# Patient Record
Sex: MALE | Race: WHITE | Employment: FULL TIME | URBAN - METROPOLITAN AREA
[De-identification: names, ages, dates, MRNs, and addresses within clinical notes are randomized per-mention and may not be internally consistent; named-entity substitution may affect disease eponyms.]

---
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
## 2021-12-25 ENCOUNTER — HOSPITAL ENCOUNTER (EMERGENCY)
Facility: CLINIC | Age: 21
Discharge: HOME | End: 2021-12-25
Attending: EMERGENCY MEDICINE | Admitting: EMERGENCY MEDICINE
Payer: COMMERCIAL

## 2021-12-25 ENCOUNTER — APPOINTMENT (OUTPATIENT)
Dept: CT IMAGING | Facility: CLINIC | Age: 21
End: 2021-12-25
Attending: EMERGENCY MEDICINE
Payer: COMMERCIAL

## 2021-12-25 VITALS
OXYGEN SATURATION: 99 % | RESPIRATION RATE: 18 BRPM | WEIGHT: 140 LBS | TEMPERATURE: 98.4 F | DIASTOLIC BLOOD PRESSURE: 84 MMHG | HEIGHT: 65 IN | SYSTOLIC BLOOD PRESSURE: 136 MMHG | BODY MASS INDEX: 23.32 KG/M2 | HEART RATE: 80 BPM

## 2021-12-25 DIAGNOSIS — S09.90XA CLOSED HEAD INJURY, INITIAL ENCOUNTER: ICD-10-CM
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
## 2021-12-25 DIAGNOSIS — S01.81XA LACERATION OF FOREHEAD, INITIAL ENCOUNTER: ICD-10-CM

## 2021-12-25 DIAGNOSIS — E87.6 HYPOKALEMIA: ICD-10-CM

## 2021-12-25 DIAGNOSIS — F10.920 ALCOHOLIC INTOXICATION WITHOUT COMPLICATION (H): ICD-10-CM

## 2021-12-25 LAB
ALBUMIN SERPL-MCNC: 4.3 G/DL (ref 3.4–5)
ALP SERPL-CCNC: 67 U/L (ref 40–150)
ALT SERPL W P-5'-P-CCNC: 32 U/L (ref 0–70)
AMPHETAMINES UR QL SCN: ABNORMAL
ANION GAP SERPL CALCULATED.3IONS-SCNC: 6 MMOL/L (ref 3–14)
APTT PPP: 30 SECONDS (ref 22–38)
AST SERPL W P-5'-P-CCNC: 17 U/L (ref 0–45)
BARBITURATES UR QL: ABNORMAL
BASOPHILS # BLD AUTO: 0.1 10E3/UL (ref 0–0.2)
BASOPHILS NFR BLD AUTO: 1 %
BENZODIAZ UR QL: ABNORMAL
BILIRUB SERPL-MCNC: 0.3 MG/DL (ref 0.2–1.3)
BUN SERPL-MCNC: 10 MG/DL (ref 7–30)
CALCIUM SERPL-MCNC: 8.5 MG/DL (ref 8.5–10.1)
CANNABINOIDS UR QL SCN: ABNORMAL
CHLORIDE BLD-SCNC: 108 MMOL/L (ref 94–109)
CO2 SERPL-SCNC: 26 MMOL/L (ref 20–32)
COCAINE UR QL: ABNORMAL
CREAT SERPL-MCNC: 0.81 MG/DL (ref 0.66–1.25)
EOSINOPHIL # BLD AUTO: 0.1 10E3/UL (ref 0–0.7)
EOSINOPHIL NFR BLD AUTO: 2 %
ERYTHROCYTE [DISTWIDTH] IN BLOOD BY AUTOMATED COUNT: 12.2 % (ref 10–15)
ETHANOL SERPL-MCNC: 0.27 G/DL
GFR SERPL CREATININE-BSD FRML MDRD: >90 ML/MIN/1.73M2
GLUCOSE BLD-MCNC: 115 MG/DL (ref 70–99)
HCT VFR BLD AUTO: 45 % (ref 40–53)
HGB BLD-MCNC: 14.4 G/DL (ref 13.3–17.7)
HOLD SPECIMEN: NORMAL
HOLD SPECIMEN: NORMAL
IMM GRANULOCYTES # BLD: 0 10E3/UL
IMM GRANULOCYTES NFR BLD: 1 %
INR PPP: 0.89 (ref 0.85–1.15)
LYMPHOCYTES # BLD AUTO: 3.1 10E3/UL (ref 0.8–5.3)
LYMPHOCYTES NFR BLD AUTO: 36 %
MCH RBC QN AUTO: 30.3 PG (ref 26.5–33)
MCHC RBC AUTO-ENTMCNC: 32 G/DL (ref 31.5–36.5)
MCV RBC AUTO: 95 FL (ref 78–100)
MONOCYTES # BLD AUTO: 0.5 10E3/UL (ref 0–1.3)
MONOCYTES NFR BLD AUTO: 5 %
NEUTROPHILS # BLD AUTO: 4.7 10E3/UL (ref 1.6–8.3)
NEUTROPHILS NFR BLD AUTO: 55 %
NRBC # BLD AUTO: 0 10E3/UL
NRBC BLD AUTO-RTO: 0 /100
OPIATES UR QL SCN: ABNORMAL
PLATELET # BLD AUTO: 354 10E3/UL (ref 150–450)
POTASSIUM BLD-SCNC: 3.3 MMOL/L (ref 3.4–5.3)
PROT SERPL-MCNC: 8.1 G/DL (ref 6.8–8.8)
RBC # BLD AUTO: 4.75 10E6/UL (ref 4.4–5.9)
SODIUM SERPL-SCNC: 140 MMOL/L (ref 133–144)
WBC # BLD AUTO: 8.5 10E3/UL (ref 4–11)

## 2021-12-25 PROCEDURE — 99285 EMERGENCY DEPT VISIT HI MDM: CPT | Mod: 25

## 2021-12-25 PROCEDURE — 72128 CT CHEST SPINE W/O DYE: CPT

## 2021-12-25 PROCEDURE — 12011 RPR F/E/E/N/L/M 2.5 CM/<: CPT

## 2021-12-25 PROCEDURE — 85730 THROMBOPLASTIN TIME PARTIAL: CPT | Performed by: EMERGENCY MEDICINE

## 2021-12-25 PROCEDURE — 80307 DRUG TEST PRSMV CHEM ANLYZR: CPT | Performed by: EMERGENCY MEDICINE

## 2021-12-25 PROCEDURE — 80053 COMPREHEN METABOLIC PANEL: CPT | Performed by: EMERGENCY MEDICINE

## 2021-12-25 PROCEDURE — 70486 CT MAXILLOFACIAL W/O DYE: CPT

## 2021-12-25 PROCEDURE — 72131 CT LUMBAR SPINE W/O DYE: CPT

## 2021-12-25 PROCEDURE — 90471 IMMUNIZATION ADMIN: CPT | Performed by: EMERGENCY MEDICINE

## 2021-12-25 PROCEDURE — 76705 ECHO EXAM OF ABDOMEN: CPT

## 2021-12-25 PROCEDURE — 85025 COMPLETE CBC W/AUTO DIFF WBC: CPT | Performed by: EMERGENCY MEDICINE

## 2021-12-25 PROCEDURE — 250N000009 HC RX 250: Performed by: EMERGENCY MEDICINE

## 2021-12-25 PROCEDURE — 250N000011 HC RX IP 250 OP 636: Performed by: EMERGENCY MEDICINE

## 2021-12-25 PROCEDURE — 85610 PROTHROMBIN TIME: CPT | Performed by: EMERGENCY MEDICINE

## 2021-12-25 PROCEDURE — 72125 CT NECK SPINE W/O DYE: CPT

## 2021-12-25 PROCEDURE — 74177 CT ABD & PELVIS W/CONTRAST: CPT

## 2021-12-25 PROCEDURE — 82077 ASSAY SPEC XCP UR&BREATH IA: CPT | Performed by: EMERGENCY MEDICINE

## 2021-12-25 PROCEDURE — 70450 CT HEAD/BRAIN W/O DYE: CPT

## 2021-12-25 PROCEDURE — 90714 TD VACC NO PRESV 7 YRS+ IM: CPT | Performed by: EMERGENCY MEDICINE

## 2021-12-25 PROCEDURE — 36415 COLL VENOUS BLD VENIPUNCTURE: CPT | Performed by: EMERGENCY MEDICINE

## 2021-12-25 PROCEDURE — 250N000013 HC RX MED GY IP 250 OP 250 PS 637: Performed by: EMERGENCY MEDICINE

## 2021-12-25 RX ORDER — POTASSIUM CHLORIDE 1500 MG/1
20 TABLET, EXTENDED RELEASE ORAL ONCE
Status: COMPLETED | OUTPATIENT
Start: 2021-12-25 | End: 2021-12-25

## 2021-12-25 RX ORDER — IOPAMIDOL 755 MG/ML
500 INJECTION, SOLUTION INTRAVASCULAR ONCE
Status: COMPLETED | OUTPATIENT
Start: 2021-12-25 | End: 2021-12-25

## 2021-12-25 RX ORDER — LIDOCAINE 40 MG/G
CREAM TOPICAL
Status: DISCONTINUED | OUTPATIENT
Start: 2021-12-25 | End: 2021-12-25 | Stop reason: HOSPADM

## 2021-12-25 RX ADMIN — IOPAMIDOL 72 ML: 755 INJECTION, SOLUTION INTRAVENOUS at 03:38

## 2021-12-25 RX ADMIN — POTASSIUM CHLORIDE 20 MEQ: 1500 TABLET, EXTENDED RELEASE ORAL at 10:36

## 2021-12-25 RX ADMIN — SODIUM CHLORIDE 58 ML: 9 INJECTION, SOLUTION INTRAVENOUS at 03:38

## 2021-12-25 RX ADMIN — CLOSTRIDIUM TETANI TOXOID ANTIGEN (FORMALDEHYDE INACTIVATED) AND CORYNEBACTERIUM DIPHTHERIAE TOXOID ANTIGEN (FORMALDEHYDE INACTIVATED) 0.5 ML: 5; 2 INJECTION, SUSPENSION INTRAMUSCULAR at 04:22

## 2021-12-25 ASSESSMENT — ENCOUNTER SYMPTOMS
BACK PAIN: 0
WOUND: 1
ABDOMINAL PAIN: 0
NECK PAIN: 0
HEADACHES: 0

## 2021-12-25 ASSESSMENT — MIFFLIN-ST. JEOR: SCORE: 1566.92

## 2021-12-25 NOTE — DISCHARGE INSTRUCTIONS
DO NOT PUT ANYTHING ON SUTURES GIVEN DISOLVABLE     Discharge Instructions  Concussion    You were seen today for signs of a concussion.  The symptoms will vary, depending on the nature of your injury and your health. You may have: headache, confusion, nausea (feel sick to your stomach), vomiting (throwing up) and problems with memory, concentrating, or sleep. You may feel dizzy, irritable, and tired. Children and teens may need help from their parents, teachers, and coaches to watch for symptoms as they recover.    Generally, every Emergency Department visit should have a follow-up clinic visit with either a primary or a specialty clinic/provider. Please follow-up as instructed by your emergency provider today.     Return to the Emergency Department if:  Your headache gets worse or you start to have a really bad headache even with the recommended treatment plan.   You feel drowsier, have growing confusion, or slurred speech.   You keep repeating yourself.   You have strange behavior or are feeling more irritable.   You have a seizure.   You vomit (throw up) more than once.   You have trouble walking.   You have weakness or numbness.  Your neck pain gets worse.   You have a loss of consciousness.   You have blood for fluid coming from your ears or nose.   You have new symptoms or anything that worries you.     Home Care:  Get lots of rest and get enough sleep at night. Take daytime naps or rest if you feel tired.   Limit physical activity and  thinking  activities. These can make symptoms worse.   Physical activities include gym, sports, weight training, running, exercise, and heavy lifting.   Thinking activities include homework, class work, job-related work, and screen time (phone, computer, tablet, TV, and video games).   Stick to a healthy diet and drink lots of fluids. Avoid alcohol.  As symptoms improve, you may slowly return to your daily activities. If symptoms get worse or return, reduce your activity.    Know that it is normal to feel sad or frustrated when you do not feel right and are less active.     Going Back to Work:  Your care team will tell you when you are ready to return to work.    Limit the amount of work you do soon after your injury. This may speed healing. Take breaks if your symptoms get worse. You should also reduce your physical activity as well as activities that require a lot of thinking until you see your doctor. You may need shorter work days and a lighter workload.  Avoid heavy lifting, working with machinery, driving and working at heights until your symptoms are gone or you are cleared by a provider.    Going Back to School:  If you are still having symptoms, you may need extra help at school.  Tell your teachers and school nurse about your injury and symptoms. Ask them to watch for problems with learning, memory, and concentrating. Symptoms may get worse when you do schoolwork, and you may become more irritable. You may need shorter school days, a reduced workload, and to postpone testing.  Do not drive or take gym class (physical activity) until cleared by a provider.    Returning to Sports:  Never return to play if you have any symptoms. A full recovery will reduce the chances of getting hurt again. Remember, it is better to miss one or two games than a whole season.  You should rest from all physical activity until you see your provider. Generally, if all symptoms have completely cleared, your provider can help guide you to slowly return to sports. If symptoms return or worsen, stop the activity and see your provider.  Important: If you are in an organized sport and under age 18, you will need written consent from a healthcare provider before you return to sports. Typically, this will be your primary care or sports medicine provider. Please make an appointment.    If you were given a prescription for medicine here today, be sure to read all of the information (including the package  insert) that comes with your prescription.  This will include important information about the medicine, its side effects, and any warnings that you need to know about.  The pharmacist who fills the prescription can provide more information and answer questions you may have about the medicine.  If you have questions or concerns that the pharmacist cannot address, please call or return to the Emergency Department.     Remember that you can always come back to the Emergency Department if you are not able to see your regular provider in the amount of time listed above, if you get any new symptoms, or if there is anything that worries you.

## 2021-12-25 NOTE — ED PROVIDER NOTES
"  History   Chief Complaint:  Fall       The history is provided by the patient and a relative.      Ramy Mcfarlane is a 21 year old male who presents for evaluation after a fall. The patient reports that he was drinking this evening and reportedly walked off a 2-story balcony. He fell on his back.  He reports hitting his head though denies LOC. This happened reportedly just prior to arrival. Patient was able to ambulate at the scene. The patient has lacerations over the left eyebrow. His cousin is at the bedside and states that the patient is acting more confused. The patient denies other ingestion tonight or thoughts of self harm. He also denies headache, chest pain, difficulty breathing, abdominal pain, neck or back pain.  Unknown tetanus status.    Review of Systems   Respiratory:        (-) difficulty breathing   Cardiovascular: Negative for chest pain.   Gastrointestinal: Negative for abdominal pain.   Musculoskeletal: Negative for back pain and neck pain.   Skin: Positive for wound (laceration).   Neurological: Negative for headaches.   All other systems reviewed and are negative.    Allergies:  Peanut Butter Flavor    Medications:  The patient is currently on no regular medications.     Past Medical History:     Concussion with no loss of consciousness       Family History:    Gastrointestinal disease  Thyroid disease  Hypertension    Social History:  The patient presents with his cousin.   He reports alcohol consumption, denies other ingestion.    Physical Exam     Patient Vitals for the past 24 hrs:   BP Temp Temp src Pulse Resp SpO2 Height Weight   12/25/21 0415 127/79 -- -- 97 -- 99 % -- --   12/25/21 0316 -- -- -- -- -- -- 1.651 m (5' 5\") 63.5 kg (140 lb)   12/25/21 0315 129/70 98.4  F (36.9  C) Oral (!) 124 -- 98 % -- --   12/25/21 0309 133/74 -- -- 94 20 100 % -- --   12/25/21 0302 (!) 152/75 97.4  F (36.3  C) Temporal 95 16 100 % -- --       Physical Exam  General: Alert. Intoxicated " appearing  Head:  The scalp is without trauma; 1cm abrasion noted to central forehead  Eyes:  Sclera white; Pupils are equal and round; L. Eyebrow with soft tissue swelling and 2cm partial thickness laceration, bleeding controlled, no bony/arterial involvement or FB visualized  ENT:    External ears normal.  No hemotympanum.      External nares normal.  No septal hematoma.    Neck:  No midline tenderness or pain with full ROM.  CV:  Rate as above with regular rhythm   No murmur   2/2 radial and dorsal pedal pulses  Resp:  Breath sounds clear and equal bilaterally    Non-labored, no retractions or accessory muscle use  GI:  Abdomen soft, non-tender, non-distended    No rebound tenderness or guarding  MSK:  No midline tenderness or bony step-off    No deformity    Moves all extremities equally and symmetrically  Skin:  No rash or lesions noted.  Neuro:   No apparent deficit.    Strength 5/5 x4.  Sensation intact x4.     GCS: 15  Psych:  Normal affect.        Emergency Department Course     Imaging:  CT Thoracic Spine w/o Contrast   Final Result   IMPRESSION:   THORACIC SPINE CT:   1.  Within the limits of mild motion during acquisition in the upper thoracic region, no acute fracture or subluxation is identified.      LUMBAR SPINE CT:   1.  No fracture or posttraumatic subluxation.   2.  No high-grade spinal canal or neural foraminal stenosis.         CT Lumbar Spine w/o Contrast   Final Result   IMPRESSION:   THORACIC SPINE CT:   1.  Within the limits of mild motion during acquisition in the upper thoracic region, no acute fracture or subluxation is identified.      LUMBAR SPINE CT:   1.  No fracture or posttraumatic subluxation.   2.  No high-grade spinal canal or neural foraminal stenosis.         CT Facial Bones without Contrast   Final Result   IMPRESSION:   HEAD CT:   1.  No acute intracranial hemorrhage or calvarial fracture.      FACIAL BONE CT:   1.  Images are significantly degraded by motion.   2.  Within  this limitation, no obvious acute facial bone fracture is identified.      CERVICAL SPINE CT:   1.  No fracture or posttraumatic subluxation.   2.  No high-grade spinal canal or neural foraminal stenosis.         CT Chest/Abdomen/Pelvis w Contrast   Final Result   IMPRESSION:   1.  No acute traumatic injury identified in the chest, abdomen or pelvis.   2.  Minimal free pelvic fluid is nonspecific.      CT Cervical Spine w/o Contrast   Final Result   IMPRESSION:   HEAD CT:   1.  No acute intracranial hemorrhage or calvarial fracture.      FACIAL BONE CT:   1.  Images are significantly degraded by motion.   2.  Within this limitation, no obvious acute facial bone fracture is identified.      CERVICAL SPINE CT:   1.  No fracture or posttraumatic subluxation.   2.  No high-grade spinal canal or neural foraminal stenosis.         CT Head w/o Contrast   Final Result   IMPRESSION:   HEAD CT:   1.  No acute intracranial hemorrhage or calvarial fracture.      FACIAL BONE CT:   1.  Images are significantly degraded by motion.   2.  Within this limitation, no obvious acute facial bone fracture is identified.      CERVICAL SPINE CT:   1.  No fracture or posttraumatic subluxation.   2.  No high-grade spinal canal or neural foraminal stenosis.         Report per radiology    Laboratory:  Labs Ordered and Resulted from Time of ED Arrival to Time of ED Departure   COMPREHENSIVE METABOLIC PANEL - Abnormal       Result Value    Sodium 140      Potassium 3.3 (*)     Chloride 108      Carbon Dioxide (CO2) 26      Anion Gap 6      Urea Nitrogen 10      Creatinine 0.81      Calcium 8.5      Glucose 115 (*)     Alkaline Phosphatase 67      AST 17      ALT 32      Protein Total 8.1      Albumin 4.3      Bilirubin Total 0.3      GFR Estimate >90     ETHYL ALCOHOL LEVEL - Abnormal    Alcohol ethyl 0.27 (*)    DRUG ABUSE SCREEN 1 URINE (ED) - Abnormal    Amphetamines Urine Screen Negative      Barbiturates Urine Screen Negative       Benzodiazepines Urine Screen Negative      Cannabinoids Urine Screen Positive (*)     Cocaine Urine Screen Negative      Opiates Urine Screen Negative     INR - Normal    INR 0.89     PARTIAL THROMBOPLASTIN TIME - Normal    aPTT 30     CBC WITH PLATELETS AND DIFFERENTIAL    WBC Count 8.5      RBC Count 4.75      Hemoglobin 14.4      Hematocrit 45.0      MCV 95      MCH 30.3      MCHC 32.0      RDW 12.2      Platelet Count 354      % Neutrophils 55      % Lymphocytes 36      % Monocytes 5      % Eosinophils 2      % Basophils 1      % Immature Granulocytes 1      NRBCs per 100 WBC 0      Absolute Neutrophils 4.7      Absolute Lymphocytes 3.1      Absolute Monocytes 0.5      Absolute Eosinophils 0.1      Absolute Basophils 0.1      Absolute Immature Granulocytes 0.0      Absolute NRBCs 0.0          Procedures    FAST Ultrasound     PROCEDURE: PERFORMED BY: Dr. Maria Isabel Li,   INDICATIONS/SYMPTOM: Fall  PROBE: Low frequency convex probe  BODY LOCATION: The ultrasound was performed in the abdominal and subxiphoid areas.  FINDINGS: No evidence of free fluid in hepatorenal (Morison's pouch), perisplenic, or and pelvic areas. No evidence of pericardial effusion.  INTERPRETATION: The FAST exam was normal. There was no free fluid present. There was no pericardial effusion.  IMAGE DOCUMENTATION: Images were archived to hard drive.          Laceration Repair        LACERATION:  A simple clean 2 cm laceration.      LOCATION:  Left eyebrow      FUNCTION:  Distally sensation, circulation, motor and tendon function are intact.      ANESTHESIA:  Local using lidocaine 1% with epinephrine total of 1 mLs.      PREPARATION:  Irrigation with Normal Saline and Shur Clens.      DEBRIDEMENT:  No debridement and wound explored, no foreign body found.      CLOSURE:  Wound was closed with One Layer. Skin closed with 3 x 5.0 Fast absorbing gut using interrupted sutures.      Emergency Department Course:  Reviewed:  I reviewed nursing  notes, vitals, past medical history and Care Everywhere    Assessments/Consults:  0304 I obtained history and examined the patient as noted above.      0305 A partial trauma was called at this time.     0312 I rechecked the patient and performed a FAST US at this time, which is negative.     0407 I rechecked the patient and explained findings.     0424 I performed a laceration repair at this time, see procedure note above.      0530 The patient is sleeping at this time.      Interventions:  0422 Td 0.5 mL IM    Disposition:  Care of the patient was transferred to my colleague Dr. Wong pending clinical sobriety and reevaluation.     Impression & Plan   Trauma:  Level of trauma activation: Trauma evaluation called at 0304. Partial trauma called at 0305.  C-collar and immobilization: applied in ED on initial evaluation.  CSpine Clearance: by imaging  GCS at arrival: 14  GCS at disposition: pending  Full Primary and Secondary survey with appropriate immobilization of spine completed in exam section.  Consults prior to admission or transfer: None  Procedures done in the ED: Laceration repair    Medical Decision Making:  Patient is a 21-year-old male presenting after reported fall from a two-story balcony.  Patient denies any thoughts of self-harm.  Partial trauma called.  Bedside FAST negative.  Fortunately CT imaging reassuring without evidence of intracranial, bony/chest/abdominal/pelvic trauma.  He has a laceration to his L. Eyebrow which was repaired as noted herein.  Risks of scarring and infection reviewed with patient. Wound precautions given. Remainder of head to toe exam is negative.  Patient remained hemodynamically stable.  Patient to be managed as closed head injury.  Closed head injury precautions discussed as well as secondary impact.  Patient acutely intoxicated today and placed on an BRYAN on arrival.  He is pending clinical sobriety and reevaluation.  Patient denies suicidal ideations but the mechanism  of walking off a 2 story balcony seems a bit odd to me.  Signed out to Dr. Wong.      Diagnosis:    ICD-10-CM    1. Alcoholic intoxication without complication (H)  F10.920    2. Laceration of forehead, initial encounter  S01.81XA    3. Closed head injury, initial encounter  S09.90XA    4. Fall, initial encounter  W19.XXXA    5. Hypokalemia  E87.6        Discharge Medications:  New Prescriptions    No medications on file       Scribe Disclosure:  I, Marcy Cobian, am serving as a scribe at 3:10 AM on 12/25/2021 to document services personally performed by Maria Isabel Li DO based on my observations and the provider's statements to me.        Maria Isabel Li,   12/25/21 0532

## 2021-12-25 NOTE — ED NOTES
Pt up to the bathroom independently. Pt requested phone, telephone is given to patient. Pt requests wanting to go home.  MD notified. Will continue to monitor.

## 2021-12-25 NOTE — ED TRIAGE NOTES
Pt fell from a second story balcony, landed on grass on his back.  Has laceration over left eye.  No LOC. Pt admits to drinking alcohol.

## 2021-12-25 NOTE — ED NOTES
Pt arrived to room ED6 0633. Resting comfortably. Brother-in-law Juan left. Can call with updates [phone]